# Patient Record
Sex: FEMALE | Race: WHITE | NOT HISPANIC OR LATINO | Employment: OTHER | URBAN - METROPOLITAN AREA
[De-identification: names, ages, dates, MRNs, and addresses within clinical notes are randomized per-mention and may not be internally consistent; named-entity substitution may affect disease eponyms.]

---

## 2022-10-07 ENCOUNTER — LAB REQUISITION (OUTPATIENT)
Dept: LAB | Facility: CLINIC | Age: 32
End: 2022-10-07

## 2022-10-07 DIAGNOSIS — Z01.419 ENCOUNTER FOR GYNECOLOGICAL EXAMINATION (GENERAL) (ROUTINE) WITHOUT ABNORMAL FINDINGS: ICD-10-CM

## 2022-10-07 PROCEDURE — G0145 SCR C/V CYTO,THINLAYER,RESCR: HCPCS | Performed by: PHYSICIAN ASSISTANT

## 2022-10-07 PROCEDURE — 87624 HPV HI-RISK TYP POOLED RSLT: CPT | Performed by: PHYSICIAN ASSISTANT

## 2022-10-11 LAB
BKR LAB AP GYN ADEQUACY: NORMAL
BKR LAB AP GYN INTERPRETATION: NORMAL
BKR LAB AP HPV REFLEX: NORMAL
BKR LAB AP LMP: NORMAL
BKR LAB AP PREVIOUS ABNL DX: NORMAL
BKR LAB AP PREVIOUS ABNORMAL: NORMAL
PATH REPORT.COMMENTS IMP SPEC: NORMAL
PATH REPORT.COMMENTS IMP SPEC: NORMAL
PATH REPORT.RELEVANT HX SPEC: NORMAL

## 2022-10-13 LAB
HUMAN PAPILLOMA VIRUS 16 DNA: NEGATIVE
HUMAN PAPILLOMA VIRUS 18 DNA: NEGATIVE
HUMAN PAPILLOMA VIRUS FINAL DIAGNOSIS: NORMAL
HUMAN PAPILLOMA VIRUS OTHER HR: NEGATIVE

## 2024-02-23 ENCOUNTER — LAB REQUISITION (OUTPATIENT)
Dept: LAB | Facility: CLINIC | Age: 34
End: 2024-02-23

## 2024-02-23 DIAGNOSIS — Z34.81 ENCOUNTER FOR SUPERVISION OF OTHER NORMAL PREGNANCY, FIRST TRIMESTER: ICD-10-CM

## 2024-02-23 LAB
ABO/RH(D): NORMAL
ANTIBODY SCREEN: NEGATIVE
BASOPHILS # BLD AUTO: 0 10E3/UL (ref 0–0.2)
BASOPHILS NFR BLD AUTO: 0 %
EOSINOPHIL # BLD AUTO: 0 10E3/UL (ref 0–0.7)
EOSINOPHIL NFR BLD AUTO: 0 %
ERYTHROCYTE [DISTWIDTH] IN BLOOD BY AUTOMATED COUNT: 12.5 % (ref 10–15)
HBA1C MFR BLD: 5.4 %
HBV SURFACE AG SERPL QL IA: NONREACTIVE
HCT VFR BLD AUTO: 35.9 % (ref 35–47)
HCV AB SERPL QL IA: NONREACTIVE
HGB BLD-MCNC: 12 G/DL (ref 11.7–15.7)
HIV 1+2 AB+HIV1 P24 AG SERPL QL IA: NONREACTIVE
IMM GRANULOCYTES # BLD: 0 10E3/UL
IMM GRANULOCYTES NFR BLD: 0 %
LYMPHOCYTES # BLD AUTO: 2.1 10E3/UL (ref 0.8–5.3)
LYMPHOCYTES NFR BLD AUTO: 20 %
MCH RBC QN AUTO: 30.9 PG (ref 26.5–33)
MCHC RBC AUTO-ENTMCNC: 33.4 G/DL (ref 31.5–36.5)
MCV RBC AUTO: 93 FL (ref 78–100)
MONOCYTES # BLD AUTO: 0.5 10E3/UL (ref 0–1.3)
MONOCYTES NFR BLD AUTO: 5 %
NEUTROPHILS # BLD AUTO: 7.7 10E3/UL (ref 1.6–8.3)
NEUTROPHILS NFR BLD AUTO: 75 %
NRBC # BLD AUTO: 0 10E3/UL
NRBC BLD AUTO-RTO: 0 /100
PLATELET # BLD AUTO: 222 10E3/UL (ref 150–450)
RBC # BLD AUTO: 3.88 10E6/UL (ref 3.8–5.2)
SPECIMEN EXPIRATION DATE: NORMAL
TREPONEMA PALLIDUM ANTIBODY (EXTERNAL): NONREACTIVE
WBC # BLD AUTO: 10.4 10E3/UL (ref 4–11)

## 2024-02-23 PROCEDURE — 87340 HEPATITIS B SURFACE AG IA: CPT | Performed by: STUDENT IN AN ORGANIZED HEALTH CARE EDUCATION/TRAINING PROGRAM

## 2024-02-23 PROCEDURE — 85025 COMPLETE CBC W/AUTO DIFF WBC: CPT | Performed by: STUDENT IN AN ORGANIZED HEALTH CARE EDUCATION/TRAINING PROGRAM

## 2024-02-23 PROCEDURE — 87389 HIV-1 AG W/HIV-1&-2 AB AG IA: CPT | Performed by: STUDENT IN AN ORGANIZED HEALTH CARE EDUCATION/TRAINING PROGRAM

## 2024-02-23 PROCEDURE — 86762 RUBELLA ANTIBODY: CPT | Performed by: STUDENT IN AN ORGANIZED HEALTH CARE EDUCATION/TRAINING PROGRAM

## 2024-02-23 PROCEDURE — 87086 URINE CULTURE/COLONY COUNT: CPT | Performed by: STUDENT IN AN ORGANIZED HEALTH CARE EDUCATION/TRAINING PROGRAM

## 2024-02-23 PROCEDURE — 83036 HEMOGLOBIN GLYCOSYLATED A1C: CPT | Performed by: STUDENT IN AN ORGANIZED HEALTH CARE EDUCATION/TRAINING PROGRAM

## 2024-02-23 PROCEDURE — 86900 BLOOD TYPING SEROLOGIC ABO: CPT | Performed by: STUDENT IN AN ORGANIZED HEALTH CARE EDUCATION/TRAINING PROGRAM

## 2024-02-23 PROCEDURE — 86803 HEPATITIS C AB TEST: CPT | Performed by: STUDENT IN AN ORGANIZED HEALTH CARE EDUCATION/TRAINING PROGRAM

## 2024-02-25 LAB — BACTERIA UR CULT: NORMAL

## 2024-02-26 LAB
RPR SER QL: NONREACTIVE
RUBV IGG SERPL QL IA: 1.1 INDEX
RUBV IGG SERPL QL IA: POSITIVE

## 2024-06-07 ENCOUNTER — LAB REQUISITION (OUTPATIENT)
Dept: LAB | Facility: CLINIC | Age: 34
End: 2024-06-07

## 2024-06-07 DIAGNOSIS — Z36.89 ENCOUNTER FOR OTHER SPECIFIED ANTENATAL SCREENING: ICD-10-CM

## 2024-06-07 LAB
ANTIBODY SCREEN: NEGATIVE
SPECIMEN EXPIRATION DATE: NORMAL

## 2024-06-07 PROCEDURE — 86850 RBC ANTIBODY SCREEN: CPT | Performed by: STUDENT IN AN ORGANIZED HEALTH CARE EDUCATION/TRAINING PROGRAM

## 2024-08-02 ENCOUNTER — LAB REQUISITION (OUTPATIENT)
Dept: LAB | Facility: CLINIC | Age: 34
End: 2024-08-02
Payer: COMMERCIAL

## 2024-08-02 DIAGNOSIS — Z36.89 ENCOUNTER FOR OTHER SPECIFIED ANTENATAL SCREENING: ICD-10-CM

## 2024-08-02 PROCEDURE — 87653 STREP B DNA AMP PROBE: CPT | Mod: ORL | Performed by: STUDENT IN AN ORGANIZED HEALTH CARE EDUCATION/TRAINING PROGRAM

## 2024-08-03 LAB — GP B STREP DNA SPEC QL NAA+PROBE: NEGATIVE

## 2024-08-19 ENCOUNTER — ANESTHESIA EVENT (OUTPATIENT)
Dept: OBGYN | Facility: CLINIC | Age: 34
End: 2024-08-19
Payer: COMMERCIAL

## 2024-08-19 ENCOUNTER — ANESTHESIA (OUTPATIENT)
Dept: OBGYN | Facility: CLINIC | Age: 34
End: 2024-08-19
Payer: COMMERCIAL

## 2024-08-19 ENCOUNTER — HOSPITAL ENCOUNTER (INPATIENT)
Facility: CLINIC | Age: 34
LOS: 1 days | Discharge: HOME | End: 2024-08-20
Attending: OBSTETRICS & GYNECOLOGY | Admitting: OBSTETRICS & GYNECOLOGY
Payer: COMMERCIAL

## 2024-08-19 LAB
ABO/RH(D): NORMAL
ABO/RH(D): NORMAL
ANTIBODY SCREEN: NEGATIVE
ERYTHROCYTE [DISTWIDTH] IN BLOOD BY AUTOMATED COUNT: 12.4 % (ref 10–15)
FETAL BLEED SCREEN: NORMAL
HCT VFR BLD AUTO: 35.6 % (ref 35–47)
HGB BLD-MCNC: 12.1 G/DL (ref 11.7–15.7)
MCH RBC QN AUTO: 31.7 PG (ref 26.5–33)
MCHC RBC AUTO-ENTMCNC: 34 G/DL (ref 31.5–36.5)
MCV RBC AUTO: 93 FL (ref 78–100)
PLATELET # BLD AUTO: 180 10E3/UL (ref 150–450)
RBC # BLD AUTO: 3.82 10E6/UL (ref 3.8–5.2)
SPECIMEN EXPIRATION DATE: NORMAL
SPECIMEN EXPIRATION DATE: NORMAL
T PALLIDUM AB SER QL: NONREACTIVE
WBC # BLD AUTO: 11.7 10E3/UL (ref 4–11)

## 2024-08-19 PROCEDURE — 86780 TREPONEMA PALLIDUM: CPT | Performed by: OBSTETRICS & GYNECOLOGY

## 2024-08-19 PROCEDURE — 59400 OBSTETRICAL CARE: CPT | Performed by: ANESTHESIOLOGY

## 2024-08-19 PROCEDURE — 10907ZC DRAINAGE OF AMNIOTIC FLUID, THERAPEUTIC FROM PRODUCTS OF CONCEPTION, VIA NATURAL OR ARTIFICIAL OPENING: ICD-10-PCS | Performed by: OBSTETRICS & GYNECOLOGY

## 2024-08-19 PROCEDURE — 00HU33Z INSERTION OF INFUSION DEVICE INTO SPINAL CANAL, PERCUTANEOUS APPROACH: ICD-10-PCS | Performed by: ANESTHESIOLOGY

## 2024-08-19 PROCEDURE — 250N000013 HC RX MED GY IP 250 OP 250 PS 637: Performed by: OBSTETRICS & GYNECOLOGY

## 2024-08-19 PROCEDURE — 250N000009 HC RX 250: Performed by: OBSTETRICS & GYNECOLOGY

## 2024-08-19 PROCEDURE — 85461 HEMOGLOBIN FETAL: CPT | Performed by: OBSTETRICS & GYNECOLOGY

## 2024-08-19 PROCEDURE — 250N000011 HC RX IP 250 OP 636: Performed by: ANESTHESIOLOGY

## 2024-08-19 PROCEDURE — 258N000003 HC RX IP 258 OP 636: Performed by: OBSTETRICS & GYNECOLOGY

## 2024-08-19 PROCEDURE — 85027 COMPLETE CBC AUTOMATED: CPT | Performed by: OBSTETRICS & GYNECOLOGY

## 2024-08-19 PROCEDURE — 3E0R3BZ INTRODUCTION OF ANESTHETIC AGENT INTO SPINAL CANAL, PERCUTANEOUS APPROACH: ICD-10-PCS | Performed by: ANESTHESIOLOGY

## 2024-08-19 PROCEDURE — 86900 BLOOD TYPING SEROLOGIC ABO: CPT | Performed by: OBSTETRICS & GYNECOLOGY

## 2024-08-19 PROCEDURE — 120N000012 HC R&B POSTPARTUM

## 2024-08-19 PROCEDURE — 722N000001 HC LABOR CARE VAGINAL DELIVERY SINGLE

## 2024-08-19 PROCEDURE — 370N000003 HC ANESTHESIA WARD SERVICE: Performed by: ANESTHESIOLOGY

## 2024-08-19 PROCEDURE — G0463 HOSPITAL OUTPT CLINIC VISIT: HCPCS

## 2024-08-19 PROCEDURE — 36415 COLL VENOUS BLD VENIPUNCTURE: CPT | Performed by: OBSTETRICS & GYNECOLOGY

## 2024-08-19 RX ORDER — KETOROLAC TROMETHAMINE 30 MG/ML
30 INJECTION, SOLUTION INTRAMUSCULAR; INTRAVENOUS
Status: DISCONTINUED | OUTPATIENT
Start: 2024-08-19 | End: 2024-08-19

## 2024-08-19 RX ORDER — MISOPROSTOL 200 UG/1
800 TABLET ORAL
Status: DISCONTINUED | OUTPATIENT
Start: 2024-08-19 | End: 2024-08-19

## 2024-08-19 RX ORDER — NALOXONE HYDROCHLORIDE 0.4 MG/ML
0.2 INJECTION, SOLUTION INTRAMUSCULAR; INTRAVENOUS; SUBCUTANEOUS
Status: DISCONTINUED | OUTPATIENT
Start: 2024-08-19 | End: 2024-08-19

## 2024-08-19 RX ORDER — LOPERAMIDE HCL 2 MG
4 CAPSULE ORAL
Status: DISCONTINUED | OUTPATIENT
Start: 2024-08-19 | End: 2024-08-20 | Stop reason: HOSPADM

## 2024-08-19 RX ORDER — CEFAZOLIN SODIUM/WATER 2 G/20 ML
SYRINGE (ML) INTRAVENOUS
Status: DISCONTINUED
Start: 2024-08-19 | End: 2024-08-19 | Stop reason: WASHOUT

## 2024-08-19 RX ORDER — ONDANSETRON 2 MG/ML
4 INJECTION INTRAMUSCULAR; INTRAVENOUS EVERY 6 HOURS PRN
Status: DISCONTINUED | OUTPATIENT
Start: 2024-08-19 | End: 2024-08-19

## 2024-08-19 RX ORDER — CARBOPROST TROMETHAMINE 250 UG/ML
250 INJECTION, SOLUTION INTRAMUSCULAR
Status: DISCONTINUED | OUTPATIENT
Start: 2024-08-19 | End: 2024-08-19

## 2024-08-19 RX ORDER — LIDOCAINE 40 MG/G
CREAM TOPICAL
Status: DISCONTINUED | OUTPATIENT
Start: 2024-08-19 | End: 2024-08-19

## 2024-08-19 RX ORDER — METHYLERGONOVINE MALEATE 0.2 MG/ML
200 INJECTION INTRAVENOUS
Status: DISCONTINUED | OUTPATIENT
Start: 2024-08-19 | End: 2024-08-20 | Stop reason: HOSPADM

## 2024-08-19 RX ORDER — CEFAZOLIN SODIUM 2 G/100ML
2 INJECTION, SOLUTION INTRAVENOUS ONCE
Status: COMPLETED | OUTPATIENT
Start: 2024-08-19 | End: 2024-08-19

## 2024-08-19 RX ORDER — IBUPROFEN 400 MG/1
800 TABLET, FILM COATED ORAL EVERY 6 HOURS PRN
Status: DISCONTINUED | OUTPATIENT
Start: 2024-08-19 | End: 2024-08-20 | Stop reason: HOSPADM

## 2024-08-19 RX ORDER — MODIFIED LANOLIN
OINTMENT (GRAM) TOPICAL
Status: DISCONTINUED | OUTPATIENT
Start: 2024-08-19 | End: 2024-08-20 | Stop reason: HOSPADM

## 2024-08-19 RX ORDER — ONDANSETRON 4 MG/1
4 TABLET, ORALLY DISINTEGRATING ORAL EVERY 6 HOURS PRN
Status: DISCONTINUED | OUTPATIENT
Start: 2024-08-19 | End: 2024-08-19

## 2024-08-19 RX ORDER — NALBUPHINE HYDROCHLORIDE 20 MG/ML
2.5-5 INJECTION, SOLUTION INTRAMUSCULAR; INTRAVENOUS; SUBCUTANEOUS EVERY 6 HOURS PRN
Status: DISCONTINUED | OUTPATIENT
Start: 2024-08-19 | End: 2024-08-19

## 2024-08-19 RX ORDER — METHYLERGONOVINE MALEATE 0.2 MG/ML
200 INJECTION INTRAVENOUS
Status: DISCONTINUED | OUTPATIENT
Start: 2024-08-19 | End: 2024-08-19

## 2024-08-19 RX ORDER — LOPERAMIDE HCL 2 MG
2 CAPSULE ORAL
Status: DISCONTINUED | OUTPATIENT
Start: 2024-08-19 | End: 2024-08-20 | Stop reason: HOSPADM

## 2024-08-19 RX ORDER — ROPIVACAINE HYDROCHLORIDE 2 MG/ML
INJECTION, SOLUTION EPIDURAL; INFILTRATION; PERINEURAL
Status: COMPLETED | OUTPATIENT
Start: 2024-08-19 | End: 2024-08-19

## 2024-08-19 RX ORDER — CITRIC ACID/SODIUM CITRATE 334-500MG
30 SOLUTION, ORAL ORAL
Status: DISCONTINUED | OUTPATIENT
Start: 2024-08-19 | End: 2024-08-19

## 2024-08-19 RX ORDER — LOPERAMIDE HCL 2 MG
2 CAPSULE ORAL
Status: DISCONTINUED | OUTPATIENT
Start: 2024-08-19 | End: 2024-08-19

## 2024-08-19 RX ORDER — LOPERAMIDE HCL 2 MG
4 CAPSULE ORAL
Status: DISCONTINUED | OUTPATIENT
Start: 2024-08-19 | End: 2024-08-19

## 2024-08-19 RX ORDER — ACETAMINOPHEN 325 MG/1
650 TABLET ORAL EVERY 4 HOURS PRN
Status: DISCONTINUED | OUTPATIENT
Start: 2024-08-19 | End: 2024-08-20 | Stop reason: HOSPADM

## 2024-08-19 RX ORDER — TRANEXAMIC ACID 10 MG/ML
1 INJECTION, SOLUTION INTRAVENOUS EVERY 30 MIN PRN
Status: DISCONTINUED | OUTPATIENT
Start: 2024-08-19 | End: 2024-08-19

## 2024-08-19 RX ORDER — ROPIVACAINE HYDROCHLORIDE 2 MG/ML
10 INJECTION, SOLUTION EPIDURAL; INFILTRATION; PERINEURAL ONCE
Status: DISCONTINUED | OUTPATIENT
Start: 2024-08-19 | End: 2024-08-19

## 2024-08-19 RX ORDER — FENTANYL CITRATE 50 UG/ML
100 INJECTION, SOLUTION INTRAMUSCULAR; INTRAVENOUS
Status: DISCONTINUED | OUTPATIENT
Start: 2024-08-19 | End: 2024-08-19

## 2024-08-19 RX ORDER — IBUPROFEN 400 MG/1
800 TABLET, FILM COATED ORAL
Status: DISCONTINUED | OUTPATIENT
Start: 2024-08-19 | End: 2024-08-19

## 2024-08-19 RX ORDER — OXYTOCIN/0.9 % SODIUM CHLORIDE 30/500 ML
340 PLASTIC BAG, INJECTION (ML) INTRAVENOUS CONTINUOUS PRN
Status: DISCONTINUED | OUTPATIENT
Start: 2024-08-19 | End: 2024-08-19

## 2024-08-19 RX ORDER — MISOPROSTOL 200 UG/1
400 TABLET ORAL
Status: DISCONTINUED | OUTPATIENT
Start: 2024-08-19 | End: 2024-08-20 | Stop reason: HOSPADM

## 2024-08-19 RX ORDER — SODIUM CHLORIDE, SODIUM LACTATE, POTASSIUM CHLORIDE, CALCIUM CHLORIDE 600; 310; 30; 20 MG/100ML; MG/100ML; MG/100ML; MG/100ML
INJECTION, SOLUTION INTRAVENOUS CONTINUOUS
Status: DISCONTINUED | OUTPATIENT
Start: 2024-08-19 | End: 2024-08-19

## 2024-08-19 RX ORDER — PROCHLORPERAZINE MALEATE 5 MG
10 TABLET ORAL EVERY 6 HOURS PRN
Status: DISCONTINUED | OUTPATIENT
Start: 2024-08-19 | End: 2024-08-19

## 2024-08-19 RX ORDER — MISOPROSTOL 200 UG/1
400 TABLET ORAL
Status: DISCONTINUED | OUTPATIENT
Start: 2024-08-19 | End: 2024-08-19

## 2024-08-19 RX ORDER — OXYTOCIN/0.9 % SODIUM CHLORIDE 30/500 ML
340 PLASTIC BAG, INJECTION (ML) INTRAVENOUS CONTINUOUS PRN
Status: DISCONTINUED | OUTPATIENT
Start: 2024-08-19 | End: 2024-08-20 | Stop reason: HOSPADM

## 2024-08-19 RX ORDER — HYDROCORTISONE 25 MG/G
CREAM TOPICAL 3 TIMES DAILY PRN
Status: DISCONTINUED | OUTPATIENT
Start: 2024-08-19 | End: 2024-08-20 | Stop reason: HOSPADM

## 2024-08-19 RX ORDER — METOCLOPRAMIDE 10 MG/1
10 TABLET ORAL EVERY 6 HOURS PRN
Status: DISCONTINUED | OUTPATIENT
Start: 2024-08-19 | End: 2024-08-19

## 2024-08-19 RX ORDER — EPHEDRINE SULFATE 50 MG/ML
5 INJECTION, SOLUTION INTRAMUSCULAR; INTRAVENOUS; SUBCUTANEOUS
Status: DISCONTINUED | OUTPATIENT
Start: 2024-08-19 | End: 2024-08-19

## 2024-08-19 RX ORDER — OXYTOCIN 10 [USP'U]/ML
10 INJECTION, SOLUTION INTRAMUSCULAR; INTRAVENOUS
Status: DISCONTINUED | OUTPATIENT
Start: 2024-08-19 | End: 2024-08-19

## 2024-08-19 RX ORDER — MISOPROSTOL 200 UG/1
800 TABLET ORAL
Status: DISCONTINUED | OUTPATIENT
Start: 2024-08-19 | End: 2024-08-20 | Stop reason: HOSPADM

## 2024-08-19 RX ORDER — BISACODYL 10 MG
10 SUPPOSITORY, RECTAL RECTAL DAILY PRN
Status: DISCONTINUED | OUTPATIENT
Start: 2024-08-19 | End: 2024-08-20 | Stop reason: HOSPADM

## 2024-08-19 RX ORDER — METOCLOPRAMIDE HYDROCHLORIDE 5 MG/ML
10 INJECTION INTRAMUSCULAR; INTRAVENOUS EVERY 6 HOURS PRN
Status: DISCONTINUED | OUTPATIENT
Start: 2024-08-19 | End: 2024-08-19

## 2024-08-19 RX ORDER — OXYTOCIN/0.9 % SODIUM CHLORIDE 30/500 ML
1-24 PLASTIC BAG, INJECTION (ML) INTRAVENOUS CONTINUOUS
Status: DISCONTINUED | OUTPATIENT
Start: 2024-08-19 | End: 2024-08-19

## 2024-08-19 RX ORDER — CARBOPROST TROMETHAMINE 250 UG/ML
250 INJECTION, SOLUTION INTRAMUSCULAR
Status: DISCONTINUED | OUTPATIENT
Start: 2024-08-19 | End: 2024-08-20 | Stop reason: HOSPADM

## 2024-08-19 RX ORDER — NALOXONE HYDROCHLORIDE 0.4 MG/ML
0.4 INJECTION, SOLUTION INTRAMUSCULAR; INTRAVENOUS; SUBCUTANEOUS
Status: DISCONTINUED | OUTPATIENT
Start: 2024-08-19 | End: 2024-08-19

## 2024-08-19 RX ORDER — OXYTOCIN/0.9 % SODIUM CHLORIDE 30/500 ML
100-340 PLASTIC BAG, INJECTION (ML) INTRAVENOUS CONTINUOUS PRN
Status: DISCONTINUED | OUTPATIENT
Start: 2024-08-19 | End: 2024-08-19

## 2024-08-19 RX ORDER — ACETAMINOPHEN 325 MG/1
650 TABLET ORAL EVERY 4 HOURS PRN
Status: DISCONTINUED | OUTPATIENT
Start: 2024-08-19 | End: 2024-08-19

## 2024-08-19 RX ORDER — CETIRIZINE HYDROCHLORIDE 10 MG/1
10 TABLET, CHEWABLE ORAL DAILY
Status: ON HOLD | COMMUNITY
End: 2024-08-20

## 2024-08-19 RX ORDER — PROCHLORPERAZINE 25 MG
25 SUPPOSITORY, RECTAL RECTAL EVERY 12 HOURS PRN
Status: DISCONTINUED | OUTPATIENT
Start: 2024-08-19 | End: 2024-08-19

## 2024-08-19 RX ORDER — OXYTOCIN 10 [USP'U]/ML
10 INJECTION, SOLUTION INTRAMUSCULAR; INTRAVENOUS
Status: DISCONTINUED | OUTPATIENT
Start: 2024-08-19 | End: 2024-08-20 | Stop reason: HOSPADM

## 2024-08-19 RX ORDER — TRANEXAMIC ACID 10 MG/ML
1 INJECTION, SOLUTION INTRAVENOUS EVERY 30 MIN PRN
Status: DISCONTINUED | OUTPATIENT
Start: 2024-08-19 | End: 2024-08-20 | Stop reason: HOSPADM

## 2024-08-19 RX ADMIN — Medication 2 MILLI-UNITS/MIN: at 09:46

## 2024-08-19 RX ADMIN — SODIUM CHLORIDE, POTASSIUM CHLORIDE, SODIUM LACTATE AND CALCIUM CHLORIDE: 600; 310; 30; 20 INJECTION, SOLUTION INTRAVENOUS at 07:40

## 2024-08-19 RX ADMIN — ACETAMINOPHEN 325MG 650 MG: 325 TABLET ORAL at 12:06

## 2024-08-19 RX ADMIN — ACETAMINOPHEN 325MG 650 MG: 325 TABLET ORAL at 18:58

## 2024-08-19 RX ADMIN — IBUPROFEN 800 MG: 400 TABLET, FILM COATED ORAL at 18:58

## 2024-08-19 RX ADMIN — Medication: at 06:51

## 2024-08-19 RX ADMIN — LIDOCAINE HYDROCHLORIDE 10 ML: 10 INJECTION, SOLUTION EPIDURAL; INFILTRATION; INTRACAUDAL; PERINEURAL at 10:16

## 2024-08-19 RX ADMIN — SODIUM CHLORIDE, POTASSIUM CHLORIDE, SODIUM LACTATE AND CALCIUM CHLORIDE 1000 ML: 600; 310; 30; 20 INJECTION, SOLUTION INTRAVENOUS at 06:39

## 2024-08-19 RX ADMIN — IBUPROFEN 800 MG: 400 TABLET, FILM COATED ORAL at 12:06

## 2024-08-19 RX ADMIN — ROPIVACAINE HYDROCHLORIDE 10 ML: 2 INJECTION, SOLUTION EPIDURAL; INFILTRATION at 06:55

## 2024-08-19 ASSESSMENT — ACTIVITIES OF DAILY LIVING (ADL)
ADLS_ACUITY_SCORE: 18

## 2024-08-19 NOTE — PROVIDER NOTIFICATION
08/19/24 0749   Provider Notification   Provider Name/Title Dr. Kim   Method of Notification Electronic Page   Request Evaluate - Remote   Notification Reason Status Update;SVE;Uterine Activity  (Pt s/p epidural, SVE 7/80/-1, intact, Cat 1 EFM, ctxs q 1.5-4 m.)

## 2024-08-19 NOTE — PLAN OF CARE
Problem: Labor  Goal: Hemostasis  Outcome: Progressing  Goal: Stable Fetal Wellbeing  Outcome: Progressing  Goal: Effective Progression to Delivery  Outcome: Progressing  Goal: Absence of Infection Signs and Symptoms  Outcome: Progressing  Goal: Acceptable Pain Control  Outcome: Progressing  Goal: Normal Uterine Contraction Pattern  Outcome: Progressing   Goal Outcome Evaluation:         Plan of care discussed with patient. All questions answered. Patient agreeable to continuation of plan of care.

## 2024-08-19 NOTE — PLAN OF CARE
Pt. admitted from L&D  via wheelchair and transferred to bed with no assist. Pt. arrived with baby and was accompanied by spouse and arrived with personal belongings. Report was taken from Reina COBB in L&D. VSS. Fundus is firm and midline.  Vaginal bleeding is light.  SL in right hand.  Pt. oriented to the room and call light system.  Vital signs stable. Postpartum assessment WDL. Pain controlled with tylenol, ibuprofen, cold packs and tucks. Patient voiding without difficulty. Breastfeeding on cue with no assist, enc to call for latch checks. Patient and infant bonding well. Will continue with current plan of care.

## 2024-08-19 NOTE — H&P
"[facility] Labor and Delivery History and Physical    Leonor Meyer MRN# [id number]   Age: 34 year old YOB: 1990     Date of Admission:  2024    Primary care provider: No primary care provider on file.           Chief Complaint:   Leonor Meyer is a 34 year old female who is 38w2d pregnant and being admitted for active labor management.          Pregnancy history:     OBSTETRIC HISTORY:    OB History    Para Term  AB Living   2 1 1 0 0 0   SAB IAB Ectopic Multiple Live Births   0 0 0 0 0      # Outcome Date GA Lbr John/2nd Weight Sex Type Anes PTL Lv   2 Current            1 Term                EDC: Estimated Date of Delivery: Aug 31, 2024    Prenatal Labs:   Lab Results   Component Value Date    AS Negative 2024    HEPBANG Nonreactive 2024    HGB 12.1 2024       GBS Status:   No results found for: \"GBS\"    Active Problem List  Patient Active Problem List   Diagnosis    Indication for care or intervention related to labor and delivery       Medication Prior to Admission  Medications Prior to Admission   Medication Sig Dispense Refill Last Dose    cetirizine (ZYRTEC) 10 MG CHEW Take 10 mg by mouth daily       Prenatal Multivit-Min-Fe-FA (PRENATAL 1 + IRON OR) Take 1 capsule by mouth daily      .        Maternal Past Medical History:   History reviewed. No pertinent past medical history.                    Family History:   This patient has no significant family history            Social History:     Social History     Tobacco Use    Smoking status: Never    Smokeless tobacco: Never   Substance Use Topics    Alcohol use: Not Currently            Review of Systems:   C: NEGATIVE for fever, chills, change in weight  E/M: NEGATIVE for ear, mouth and throat problems  R: NEGATIVE for significant cough or SOB  CV: NEGATIVE for chest pain, palpitations or peripheral edema          Physical Exam:   Vitals were reviewed    Constitutional: Awake, " alert, cooperative, no apparent distress, and appears stated age.  ENT: Normocephalic, without obvious abnormality, atraumatic  Abdomen: No scars, normal bowel sounds, soft, non-distended, non-tender, no masses palpated, no hepatosplenomegally.  Genitourinary: No urethral discharge, normal external genitalia, no hernia.  Musculoskeletal: No redness, warmth, or swelling of the joints.  Full range of motion noted.   Neurologic: Awake, alert, oriented to name, place and time.  Cranial nerves II-XII are grossly intact.  Neuropsychiatric: Normal affect, mood, orientation, memory and insight.  Skin: No rashes, erythema, pallor, petechia or purpura.     Cervix:   Membranes: AROM   Dilation: 9   Effacement: 100%   Station:+2    Presentation:Cephalic  Fetal Heart Rate Tracing: reactive and reassuring, Tier 1 (normal)  Tocometer: external monitor                       Assessment:   Leonor Meyer is a 38w2d pregnant female admitted with active labor management.          Plan:   Admit - see IP orders  Anticipate     Harmony Kim MD

## 2024-08-19 NOTE — ANESTHESIA PROCEDURE NOTES
"Epidural catheter Procedure Note    Pre-Procedure   Staff -        Anesthesiologist:  Jayson Garcia MD       Performed By: anesthesiologist       Location: OB       Pre-Anesthestic Checklist: patient identified, IV checked, risks and benefits discussed, informed consent, monitors and equipment checked, pre-op evaluation and at physician/surgeon's request  Timeout:       Correct Patient: Yes        Correct Procedure: Yes        Correct Site: Yes        Correct Position: Yes   Procedure Documentation  Procedure: epidural catheter       Patient Position: sitting       Patient Prep/Sterile Barriers: sterile gloves, mask, patient draped       Skin prep: Betadine and DuraPrep       Local skin infiltrated with mL of 1% lidocaine.        Insertion Site: L2-3. (midline approach).       Technique: LORT saline        DONAVAN at 5 cm.       Needle Type: Silith.IOy needle       Needle Gauge: 17.        Needle Length (Inches): 5        Catheter: 19 G.          Catheter threaded easily.         5 cm epidural space.         Threaded 10 cm at skin.         # of attempts: 1 and  # of redirects:          : 0.    Assessment/Narrative         Paresthesias: No.       Test dose of 3 mL lidocaine 1.5% w/ 1:200,000 epinephrine at.         Test dose negative, 3 minutes after injection, for signs of intravascular, subdural, or intrathecal injection.       Insertion/Infusion Method: LORT saline       No aspiration negative for Heme or CSF via Epidural Catheter.    Medication(s) Administered   0.2% Ropivacaine (Epidural) - EPIDURAL   10 mL - 8/19/2024 6:55:00 AM   Comments:  Pt tolerated well.    Taped sterile and secure.   FHTs stable post-procedure.   No complications.       FOR [facility] ([facility] [place]) ONLY:   Pain Team Contact information: please page the Pain Team Via [employer]. Search \"Pain\". During daytime hours, please page the attending first. At night please page the resident first.      "

## 2024-08-19 NOTE — L&D DELIVERY NOTE
The patient is a 34-year-old  2 para 1 at 38+2 weeks gestational age she was admitted following spontaneous onset of labor.  She was 7 cm dilated as I took over her care.    Prenatal care was under the direction of Dr. Muñoz.  She had an uncomplicated medical history and pregnancy course.  Prenatal serologies are as follows: Blood type A-, antibody screen negative, hepatitis C negative, HIV negative, gonorrhea and Chlamydia negative, hepatitis B negative, rubella immune, RPR nonreactive, group B strep negative, 1 hour glucose tolerance test 133.    Hospital course: The patient was admitted at 5 cm of dilation.  She was progressing in the active phase of labor.  She had an epidural in place and was 7 cm dilated when I took over on her care.  I then presented to the bedside within an hour and she was 9 cm and amniotomy was performed for clear fluid.  She very quickly within 15 minutes became completely dilated.  She then went on to push over an hour to deliver a male infant with Apgar scores of 9 and 9 from a right occiput anterior presentation over an over a small episiotomy.  There was an obvious scar from previous episiotomy in the midline from her previous delivery that was not stretching at all.  I did make a very small episiotomy that extended slightly to open her previous scar at the time of delivery.  Body and shoulders delivered delivered with ease.  The infant was handed to maternal abdomen where cord was clamped and cut after 60 seconds of life.  I did inject 10 cc of 1% lidocaine plain to to assist with her repair.  I did straight catheter for about 100 cc of urine.  The umbilical cord avulsed very easily.  A portion of the placenta delivered.  With time, oxytocin, and fundal massage the remainder of the placenta delivered intact.  The second-degree episiotomy was repaired with 3-0 Vicryl in the usual fashion.  Sponge lap needle instrument counts were correct x 2 at the completion of the  delivery.    Duration at labor:  1st stage: 1h 08m  2nd stage: 1h 20m  3rd stage: 0h 12mabor

## 2024-08-19 NOTE — PLAN OF CARE
Problem: Labor  Goal: Hemostasis  8/19/2024 1144 by Reina Cummins RN  Outcome: Adequate for Care Transition  8/19/2024 0750 by Reina Cummins RN  Outcome: Progressing  Goal: Stable Fetal Wellbeing  8/19/2024 1144 by Reina Cummins RN  Outcome: Adequate for Care Transition  8/19/2024 0750 by Reina Cummins RN  Outcome: Progressing  Goal: Effective Progression to Delivery  8/19/2024 1144 by Reina Cummins RN  Outcome: Adequate for Care Transition  8/19/2024 0750 by Reina Cummins RN  Outcome: Progressing  Goal: Absence of Infection Signs and Symptoms  8/19/2024 1144 by Reina Cummins RN  Outcome: Adequate for Care Transition  8/19/2024 0750 by Reina Cummins RN  Outcome: Progressing  Goal: Acceptable Pain Control  8/19/2024 1144 by Reina Cummins RN  Outcome: Adequate for Care Transition  8/19/2024 0750 by Reina Cummins RN  Outcome: Progressing  Goal: Normal Uterine Contraction Pattern  8/19/2024 1144 by Reina Cummins RN  Outcome: Adequate for Care Transition  8/19/2024 0750 by Reina Cummins RN  Outcome: Progressing   Goal Outcome Evaluation:         Patient delivered.

## 2024-08-19 NOTE — ANESTHESIA PREPROCEDURE EVALUATION
"Anesthesia Pre-Procedure Evaluation    Patient: Leonor Meyer   MRN: [id number] : 1990        Procedure :           History reviewed. No pertinent past medical history.   History reviewed. No pertinent surgical history.   Allergies   Allergen Reactions    Cat Hair Extract Itching    Molds & Smuts Itching    Red Dye Nausea and Vomiting and Other (See Comments)      Social History     Tobacco Use    Smoking status: Never    Smokeless tobacco: Never   Substance Use Topics    Alcohol use: Not Currently      Wt Readings from Last 1 Encounters:   No data found for Wt        Anesthesia Evaluation            ROS/MED HX  ENT/Pulmonary:    (-) asthma   Neurologic:  - neg neurologic ROS     Cardiovascular:    (-) PIH   METS/Exercise Tolerance:     Hematologic:     (+)  no anticoagulation therapy, no coagulopathy,             Musculoskeletal:       GI/Hepatic:     (+) GERD,                   Renal/Genitourinary:       Endo:       Psychiatric/Substance Use:       Infectious Disease:       Malignancy:       Other:            Physical Exam    Airway        Mallampati: II   TM distance: > 3 FB   Neck ROM: full     Respiratory Devices and Support         Dental  no notable dental history         Cardiovascular   cardiovascular exam normal          Pulmonary   pulmonary exam normal                OUTSIDE LABS:  CBC:   Lab Results   Component Value Date    WBC 11.7 (H) 2024    WBC 10.4 2024    HGB 12.1 2024    HGB 12.0 2024    HCT 35.6 2024    HCT 35.9 2024     2024     2024     BMP: No results found for: \"NA\", \"POTASSIUM\", \"CHLORIDE\", \"CO2\", \"BUN\", \"CR\", \"GLC\"  COAGS: No results found for: \"PTT\", \"INR\", \"FIBR\"  POC: No results found for: \"BGM\", \"HCG\", \"HCGS\"  HEPATIC: No results found for: \"ALBUMIN\", \"PROTTOTAL\", \"ALT\", \"AST\", \"GGT\", \"ALKPHOS\", \"BILITOTAL\", \"BILIDIRECT\", \"MARTÍN\"  OTHER:   Lab Results   Component Value Date    A1C 5.4 2024 "       Anesthesia Plan    ASA Status:  2       Anesthesia Type: Epidural.              Consents    Anesthesia Plan(s) and associated risks, benefits, and realistic alternatives discussed. Questions answered and patient/representative(s) expressed understanding.     - Discussed:     - Discussed with:  Patient            Postoperative Care            Comments:    Other Comments: Orders to manage the epidural infusion have been entered, and through coordination with the nurse, we will continute to manage and monitor the patient's labor epidural.  We will continuously be available to adjust as needed thruout the entire L&D process.            Jayson Garcia MD    I have reviewed the pertinent notes and labs in the chart from the past 30 days and (re)examined the patient.  Any updates or changes from those notes are reflected in this note.

## 2024-08-19 NOTE — PROGRESS NOTES
This RN at the bedside for the  of a viable male infant born at 0959 at 38w2d GA to a mother . Patient tolerated pushing well with epidural in place. APGARs 9/9. , lacerations repaired per MD delivery note and well approximated. Fundus firm, midline, U/2. VSS. BP readings reviewed with provider, continuing to monitor as normal per provider. Pt denies S/S of headache, visual disturbances, or RUQ pain. Infant and mother stable and bonding skin-to-skin with breastfeeding initiated.

## 2024-08-19 NOTE — PROGRESS NOTES
Data: Leonor Meyer transferred to [facility] via wheelchair at 1255. Baby transferred via parent's arms.  Action: Receiving unit notified of transfer: Yes. Patient and family notified of room change. Report given to Sydnie AVILES RN at 1310. Belongings sent to receiving unit. Accompanied by Registered Nurse. Oriented patient to surroundings. Call light within reach. ID bands double-checked with receiving RN.  Response: Patient tolerated transfer and is stable.

## 2024-08-19 NOTE — PLAN OF CARE
Data: [facility]: 2024  5:56 AM.  Reason for maternal/fetal assessment is uterine contractions. Patient reports Contractions started around 0230. Has been timing them at every 1.5-3 minutes. Pt reports last SVE in clinic she was about 4cm.  Patient denies leaking of vaginal fluid/rupture of membranes. Patient reports fetal movement is normal. Patient is a 38w2d .  Prenatal record reviewed. Pregnancy has been uncomplicated.    Vital signs wnl. Support person is not present.     Action: Verbal consent for EFM. Triage assessment completed. SVE performed.     Response: Dr. Garcia paged and phone call in return. Updated on pt's arrival /-2. Cxt q2-3min. FHT cat 1. Healthy pregnancy. Requesting epidural. Verbal orders received to admit pt and get epidural.

## 2024-08-20 VITALS
TEMPERATURE: 98.1 F | HEART RATE: 66 BPM | SYSTOLIC BLOOD PRESSURE: 127 MMHG | HEIGHT: 64 IN | BODY MASS INDEX: 28.07 KG/M2 | OXYGEN SATURATION: 99 % | DIASTOLIC BLOOD PRESSURE: 71 MMHG | WEIGHT: 164.4 LBS | RESPIRATION RATE: 18 BRPM

## 2024-08-20 PROCEDURE — 250N000013 HC RX MED GY IP 250 OP 250 PS 637: Performed by: OBSTETRICS & GYNECOLOGY

## 2024-08-20 PROCEDURE — 250N000011 HC RX IP 250 OP 636: Performed by: OBSTETRICS & GYNECOLOGY

## 2024-08-20 RX ORDER — ACETAMINOPHEN 325 MG/1
650 TABLET ORAL EVERY 4 HOURS PRN
COMMUNITY
Start: 2024-08-20

## 2024-08-20 RX ORDER — BISACODYL 10 MG
10 SUPPOSITORY, RECTAL RECTAL DAILY PRN
COMMUNITY
Start: 2024-08-20

## 2024-08-20 RX ORDER — IBUPROFEN 800 MG/1
800 TABLET, FILM COATED ORAL EVERY 6 HOURS PRN
COMMUNITY
Start: 2024-08-20

## 2024-08-20 RX ADMIN — IBUPROFEN 800 MG: 400 TABLET, FILM COATED ORAL at 11:47

## 2024-08-20 RX ADMIN — IBUPROFEN 800 MG: 400 TABLET, FILM COATED ORAL at 04:43

## 2024-08-20 RX ADMIN — ACETAMINOPHEN 325MG 650 MG: 325 TABLET ORAL at 04:43

## 2024-08-20 RX ADMIN — ACETAMINOPHEN 325MG 650 MG: 325 TABLET ORAL at 11:47

## 2024-08-20 RX ADMIN — HUMAN RHO(D) IMMUNE GLOBULIN 300 MCG: 1500 SOLUTION INTRAMUSCULAR; INTRAVENOUS at 05:58

## 2024-08-20 ASSESSMENT — ACTIVITIES OF DAILY LIVING (ADL)
ADLS_ACUITY_SCORE: 18

## 2024-08-20 NOTE — PROGRESS NOTES
[facility]       DAILY NOTE - POSTPARTUM DAY 1     SUBJECTIVE:     Pain controlled? Yes  Tolerating a regular diet? YES  Ambulating? YES  Voiding without difficulty? Yes    OBJECTIVE:  Vitals:    24 1622 24 2038 24 2352 24 0430   BP: 124/79 132/77 138/75 126/66   BP Location: Right arm Right arm Right arm Right arm   Patient Position:  Semi-Couch's Semi-Couch's Semi-Couch's   Cuff Size:  Adult Regular Adult Regular Adult Regular   Pulse: 77 82 84 66   Resp: 16 16 16 16   Temp: 98.8  F (37.1  C)  99.2  F (37.3  C)    TempSrc: Oral  Oral    SpO2:       Height:           Constitutional: healthy, alert, and no distress    Abdomen:  Uterine fundus is firm, non-tender and at the level of the umbilicus     Lower extremities: no edema      LABS:  Hemoglobin   Date Value Ref Range Status   2024 12.1 11.7 - 15.7 g/dL Final   2024 12.0 11.7 - 15.7 g/dL Final       ASSESSMENT:  Post-partum day #1 s/p   Pregnancy complicated by NO COMPLICATIONS    Doing well.       PLAN:   Continue routine postpartum cares  Pt requests discharge today.  Reviewed discharge instructions.  Followup in clinic in 6wks    Bushra Colorado PA-C

## 2024-08-20 NOTE — PLAN OF CARE
Goal Outcome Evaluation:  D: VSS, assessments WDL.   I: Pt. received complete discharge paperwork.  Pt. was given times of last dose for all discharge medications in writing on discharge medication sheets.  Discharge teaching included home medication, pain management, activity restrictions, postpartum cares, and signs and symptoms of infection.    A: Discharge outcomes on care plan met.  Mother states understanding and comfort with self care and follow up care.   P: Pt. Discharged.  Pt. was accompanied by RN and left with personal belongings. Pt. to follow up with OB provider per discharge instructions.  Pt. had no further questions at the time of discharge and no unmet needs were identified.  Overall Patient Progress: improvingOverall Patient Progress: improving

## 2024-08-20 NOTE — LACTATION NOTE
Lactation visit offered to Leonor, she states infant is breastfeeding well, declines visit.       Lizeth Nunez RN, IBCLC

## 2024-08-20 NOTE — DISCHARGE INSTRUCTIONS
Warning Signs after Having a Baby    Keep this paper on your fridge or somewhere else where you can see it.    Call your provider if you have any of these symptoms up to 12 weeks after having your baby.    Thoughts of hurting yourself or your baby  Pain in your chest or trouble breathing  Severe headache not helped by pain medicine  Eyesight concerns (blurry vision, seeing spots or flashes of light, other changes to eyesight)  Fainting, shaking or other signs of a seizure    Call 9-1-1 if you feel that it is an emergency.     The symptoms below can happen to anyone after giving birth. They can be very serious. Call your provider if you have any of these warning signs.    My provider s phone number: _______________________    Losing too much blood (hemorrhage)    Call your provider if you soak through a pad in less than an hour or pass blood clots bigger than a golf ball. These may be signs that you are bleeding too much.    Blood clots in the legs or lungs    After you give birth, your body naturally clots its blood to help prevent blood loss. Sometimes this increased clotting can happen in other areas of the body, like the legs or lungs. This can block your blood flow and be very dangerous.     Call your provider if you:  Have a red, swollen spot on the back of your leg that is warm or painful when you touch it.   Are coughing up blood.     Infection    Call your provider if you have any of these symptoms:  Fever of 100.4 F (38 C) or higher.  Pain or redness around your stitches if you had an incision.   Any yellow, white, or green fluid coming from places where you had stitches or surgery.    Mood Problems (postpartum depression)    Many people feel sad or have mood changes after having a baby. But for some people, these mood swings are worse.     Call your provider right away if you feel so anxious or nervous that you can't care for yourself or your baby.    Preeclampsia (high blood pressure)    Even if you  "didn't have high blood pressure when you were pregnant, you are at risk for the high blood pressure disease called preeclampsia. This risk can last up to 12 weeks after giving birth.     Call your provider if you have:   Pain on your right side under your rib cage  Sudden swelling in the hands and face    Remember: You know your body. If something doesn't feel right, get medical help.     For informational purposes only. Not to replace the advice of your health care provider. Copyright 2020 [employer]. All rights reserved. Clinically reviewed by Ratna Ceballos, RNC-OB, MSN. [employer] 547902 - Rev .  Postpartum Care at Home With Your Baby: Care Instructions  Overview     After childbirth (postpartum period), your body goes through many changes as you recover. In these weeks after delivery, try to take good care of yourself. Get rest whenever you can and accept help from others.  It may take 4 to 6 weeks to feel like yourself again, and possibly longer if you had a  birth. You may feel sore or very tired as you recover. After delivery, you may continue to have contractions as the uterus returns to the size it was before your pregnancy. You will also have some vaginal bleeding. And you may have pain around the vagina as you heal. Several days after delivery you may also have pain and swelling in your breasts as they fill with milk. There are things you can do at home to help ease these discomforts.  After childbirth, it's common to feel emotional. You may feel irritable, cry easily, and feel happy one minute and sad the next. This is called the \"baby blues.\" Hormone changes are one cause of these emotional changes. These feelings usually get better within a couple of weeks. If they don't, talk to your doctor or midwife.  In the first couple of weeks after you give birth, your doctor or midwife may want to check in with you and make a plan for follow-up care. You will likely have a complete " postpartum visit in the first 3 months after delivery. At that time, your doctor or midwife will check on your recovery and see how you're doing. But if you have questions or concerns before then, you can always call your doctor or midwife.  Follow-up care is a key part of your treatment and safety. Be sure to make and go to all appointments, and call your doctor if you are having problems. It's also a good idea to know your test results and keep a list of the medicines you take.  How can you care for yourself at home?  Taking care of your body  Use pads instead of tampons for bleeding. After birth, you will have bloody vaginal discharge. You may also pass some blood clots that shouldn't be bigger than an egg. Over the next 6 weeks or so, your bleeding should decrease a little every day and slowly change to a pinkish and then whitish discharge.  For cramps or mild pain, try an over-the-counter pain medicine, such as acetaminophen (Tylenol) or ibuprofen (Advil, Motrin). Read and follow all instructions on the label.  To ease pain around the vagina or from hemorrhoids:  Put ice or a cold pack on the area for 10 to 20 minutes at a time. Put a thin cloth between the ice and your skin.  Try sitting in a few inches of warm water (sitz bath) when you can or after bowel movements.  Clean yourself with a gentle squeeze of warm water from a bottle instead of wiping with toilet paper.  Use witch hazel or hemorrhoid pads (such as Tucks).  Try using a cold compress for sore and swollen breasts. And wear a supportive bra that fits.  Ease constipation by drinking plenty of fluids and eating high-fiber foods. Ask your doctor or midwife about over-the-counter stool softeners.  Activity  Rest when you can.  Ask for help from family or friends when you need it.  If you can, have another adult in your home for at least 2 or 3 days after birth.  When you feel ready, try to get some exercise every day. For many people, walking is a good  choice. Don't do any heavy exercise until your doctor or midwife says it's okay.  Ask your doctor or midwife when it is okay to have vaginal sex.  If you don't want to get pregnant, talk to your doctor or midwife about birth control options. You can get pregnant even before your period returns. Also, you can get pregnant while you are breastfeeding.  Talk to your doctor or midwife if you want to get pregnant again. They can talk to you about when it is safe.  Emotional health  It's normal to have some sadness, anxiety, and mood swings after delivery. It may help to talk with a trusted friend or family member. You can also call the Maternal Mental Health Hotline at 7-564-[facility] (1-307.281.7532) for support. If these mood changes last more than a couple of weeks, talk to your doctor or midwife.  When should you call for help?  Share this information with your partner, family, or a friend. They can help you watch for warning signs.  Call 911  anytime you think you may need emergency care. For example, call if:    You feel you cannot stop from hurting yourself, your baby, or someone else.     You passed out (lost consciousness).     You have chest pain, are short of breath, or cough up blood.     You have a seizure.   Where to get help 24 hours a day, 7 days a week   If you or someone you know talks about suicide, self-harm, a mental health crisis, a substance use crisis, or any other kind of emotional distress, get help right away. You can:    Call the Suicide and Crisis Lifeline at 638.     Call 2-731-140-TALK (1-147.194.5429).     Text HOME to 649170 to access the Crisis Text Line.   Consider saving these numbers in your phone.  Go to [facility] for more information or to chat online.  Call your doctor or midwife now or seek immediate medical care if:    You have signs of hemorrhage (too much bleeding), such as:  Heavy vaginal bleeding. This means that you are soaking through one or more pads in an hour. Or  "you pass blood clots bigger than an egg.  Feeling dizzy or lightheaded, or you feel like you may faint.  Feeling so tired or weak that you cannot do your usual activities.  A fast or irregular heartbeat.  New or worse belly pain.     You have signs of infection, such as:  A fever.  Increased pain, swelling, warmth, or redness from an incision or wound.  Frequent or painful urination or blood in your urine.  Vaginal discharge that smells bad.  New or worse belly pain.     You have symptoms of a blood clot in your leg (called a deep vein thrombosis), such as:  Pain in the calf, back of the knee, thigh, or groin.  Swelling in the leg or groin.  A color change on the leg or groin. The skin may be reddish or purplish, depending on your usual skin color.     You have signs of preeclampsia, such as:  Sudden swelling of your face, hands, or feet.  New vision problems (such as dimness, blurring, or seeing spots).  A severe headache.     You have signs of heart failure, such as:  New or increased shortness of breath.  New or worse swelling in your legs, ankles, or feet.  Sudden weight gain, such as more than 2 to 3 pounds in a day or 5 pounds in a week.  Feeling so tired or weak that you cannot do your usual activities.     You had spinal or epidural pain relief and have:  New or worse back pain.  Increased pain, swelling, warmth, or redness at the injection site.  Tingling, weakness, or numbness in your legs or groin.   Watch closely for changes in your health, and be sure to contact your doctor or midwife if:    Your vaginal bleeding isn't decreasing.     You feel sad, anxious, or hopeless for more than a few days.     You are having problems with your breasts or breastfeeding.   Where can you learn more?  Go to https://www.healthwise.net/patiented  Enter Z768 in the search box to learn more about \"Postpartum Care at Home With Your Baby: Care Instructions.\"  Current as of: July 10, 2023               Content Version: " 14.0    [id number] [employer].   Care instructions adapted under license by your healthcare professional. If you have questions about a medical condition or this instruction, always ask your healthcare professional. [employer] disclaims any warranty or liability for your use of this information.

## 2024-10-06 ENCOUNTER — HEALTH MAINTENANCE LETTER (OUTPATIENT)
Age: 34
End: 2024-10-06